# Patient Record
(demographics unavailable — no encounter records)

---

## 2025-01-07 NOTE — DISCUSSION/SUMMARY
[de-identified] :  The patient is doing very well 1 year following left total hip replacement. The patient will continue their home exercises. Overall the patient is very happy with the outcome of the surgery. Dental prophylaxis was reviewed. Follow up in 5 years for radiographic surveillance.

## 2025-01-07 NOTE — HISTORY OF PRESENT ILLNESS
[de-identified] :  CORINA TELLEZ is a 74 year female presenting 1 year s/p left total hip replacement. The patient reports continuing a home exercise routine. The patient has returned to daily activities of life without significant pain or discomfort. Overall, the patient is very happy with the result of the surgery.

## 2025-01-07 NOTE — PHYSICAL EXAM
[de-identified] :  The patient appears well nourished and in no apparent distress. The patient is alert and oriented to person, place, and time. Affect and mood appear normal. The head is normocephalic and atraumatic. The eyes reveal normal sclera and extra ocular muscles are intact. The tongue is midline with no apparent lesions. Skin shows normal turgor with no evidence of eczema or psoriasis. No respiratory distress noted. Sensation grossly intact. [de-identified] :  Exam of the left hip shows a well healed incision, hip flexion of 120 degrees, hip external rotation of 50 degrees, hip internal rotation of 25-30 degrees.  5/5 motor strength bilaterally distally. Sensation intact distally. [de-identified] :  X-ray: AP of the pelvis and 2 views of the left hip demonstrate a left total hip arthroplasty in stable position, with no evidence of fracture, loosening, or dislocation.

## 2025-01-07 NOTE — REASON FOR VISIT
[Other: ____] : [unfilled] [FreeTextEntry2] : S/P: Left robotic-assisted anterior total hip replacement with MITCHEL. DOS: 1/3/24. Right anterior approach total hip replacement DOS: Jan 14, 2015.